# Patient Record
Sex: MALE | URBAN - METROPOLITAN AREA
[De-identification: names, ages, dates, MRNs, and addresses within clinical notes are randomized per-mention and may not be internally consistent; named-entity substitution may affect disease eponyms.]

---

## 2024-01-27 ENCOUNTER — EMERGENCY (EMERGENCY)
Facility: HOSPITAL | Age: 44
LOS: 0 days | Discharge: HOME | End: 2024-01-28
Attending: STUDENT IN AN ORGANIZED HEALTH CARE EDUCATION/TRAINING PROGRAM
Payer: COMMERCIAL

## 2024-01-27 VITALS
TEMPERATURE: 97 F | WEIGHT: 177.91 LBS | SYSTOLIC BLOOD PRESSURE: 140 MMHG | HEIGHT: 67 IN | RESPIRATION RATE: 18 BRPM | DIASTOLIC BLOOD PRESSURE: 78 MMHG | OXYGEN SATURATION: 99 % | HEART RATE: 85 BPM

## 2024-01-27 DIAGNOSIS — Z88.8 ALLERGY STATUS TO OTHER DRUGS, MEDICAMENTS AND BIOLOGICAL SUBSTANCES STATUS: ICD-10-CM

## 2024-01-27 DIAGNOSIS — M54.2 CERVICALGIA: ICD-10-CM

## 2024-01-27 DIAGNOSIS — R11.10 VOMITING, UNSPECIFIED: ICD-10-CM

## 2024-01-27 DIAGNOSIS — S09.90XA UNSPECIFIED INJURY OF HEAD, INITIAL ENCOUNTER: ICD-10-CM
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
## 2024-01-27 PROCEDURE — 70450 CT HEAD/BRAIN W/O DYE: CPT | Mod: MA

## 2024-01-27 PROCEDURE — 99284 EMERGENCY DEPT VISIT MOD MDM: CPT

## 2024-01-27 PROCEDURE — 72125 CT NECK SPINE W/O DYE: CPT | Mod: MA

## 2024-01-27 PROCEDURE — 99284 EMERGENCY DEPT VISIT MOD MDM: CPT | Mod: 25

## 2024-01-27 NOTE — ED PROVIDER NOTE - PATIENT PORTAL LINK FT
You can access the FollowMyHealth Patient Portal offered by [facility] by registering at the following website: [email or web address]. By joining [facility]’s FollowMyHealth portal, you will also be able to view your health information using other applications (apps) compatible with our system.

## 2024-01-27 NOTE — ED PROVIDER NOTE - OBJECTIVE STATEMENT
43-year-old male presents with complaint of fall.  Patient reports he was partying earlier today when he had a mechanical fall backwards, hitting his head against his electric fireplace.  Reports subsequent pain to the back of neck and occiput. Reports after falling he got anxious and had 2 epiosdes NBNB emesis. Denies LOC, use of ACs, vision changes, gait disturbance, focal weakness/numbness, other injuries.

## 2024-01-27 NOTE — ED PROVIDER NOTE - NSFOLLOWUPINSTRUCTIONS_ED_ALL_ED_FT
Follow-up with your PCP in 1-3 days.    Closed Head Injury    A closed head injury is an injury to your head that may or may not involve a traumatic brain injury (TBI). Symptoms of TBI can be short or long lasting and include headache, dizziness, interference with memory or speech, fatigue, confusion, changes in sleep, mood changes, nausea, depression/anxiety, and dulling of senses. Make sure to obtain proper rest which includes getting plenty of sleep, avoiding excessive visual stimulation, and avoiding activities that may cause physical or mental stress. Avoid any situation where there is potential for another head injury, including sports.    SEEK IMMEDIATE MEDICAL CARE IF YOU HAVE ANY OF THE FOLLOWING SYMPTOMS: unusual drowsiness, vomiting, severe dizziness, seizures, lightheadedness, muscular weakness, different pupil sizes, visual changes, or clear or bloody discharge from your ears or nose.

## 2024-01-27 NOTE — ED PROVIDER NOTE - CLINICAL SUMMARY MEDICAL DECISION MAKING FREE TEXT BOX
43-year-old male, no past medical history, presenting status post fall.  He states that he was drinking earlier in the day and lost his balance and fell backwards hitting the back of his head and neck onto his electric fireplace which remained intact.  Afterwards, he states he had a "panic attack" and had 2 episodes of nonbloody nonbilious vomiting.  He endorses mild posterior upper neck pain, twinges with movement, alleviated by rest, no other symptoms.  Denies LOC, AC use, dizziness, chest pain, shortness of breath, nausea.  Fall witnessed by wife who is at bedside providing additional history.  Well appearing on exam.  FROM of neck.  Minimal upper midline c-spine tenderness.  No external signs of trauma.  No focal neuro deficits.  Imaging ordered and reviewed.  Patient not requiring medications at this time.  Discussed results with patient and wife.  Given return precautions and follow-up outpatient.  Patient comfortable with plan.

## 2024-01-27 NOTE — ED ADULT TRIAGE NOTE - CHIEF COMPLAINT QUOTE
pt sts he was partying and drinking , tripped and fell hitting the back of his head. no loc, no blood thinners.

## 2024-01-27 NOTE — ED PROVIDER NOTE - PHYSICAL EXAMINATION
Physical Exam    Vital Signs: I have reviewed the initial vital signs.  Constitutional: appears stated age, no acute distress  Head: NCAT  Eyes: Conjunctiva pink, Sclera clear, PERRLA, EOMI.  Cardiovascular: S1 and S2, regular rate, regular rhythm, well-perfused extremities, radial pulses equal and 2+, pedal pulses 2+ and equal  Respiratory: unlabored respiratory effort, clear to auscultation bilaterally no wheezing, rales, or rhonchi  Gastrointestinal:  abdomen soft, non-tender, no pulsatile mass, bowel sounds within normal limits  Musculoskeletal: supple neck, no lower extremity edema, no midline tenderness, no bony tenderness  Integumentary: warm, dry, no rash  Neurologic: awake, alert, oriented x3, extremities’ motor and sensory functions grossly intact, cranial nerves II-XII grossly intact

## 2024-01-27 NOTE — ED PROVIDER NOTE - TOBACCO USE
Discharge instructions provided to pt.  ERP went over discharge instructions with pt.  Pt instructed to follow up in one week for suture removal and to watch for signs of infection.  Pt verbalized understanding.  Instructed to return to Emergency Department for new or worsening symptoms.   Unknown if ever smoked

## 2024-01-28 VITALS
OXYGEN SATURATION: 98 % | RESPIRATION RATE: 18 BRPM | TEMPERATURE: 97 F | HEART RATE: 89 BPM | SYSTOLIC BLOOD PRESSURE: 137 MMHG | DIASTOLIC BLOOD PRESSURE: 91 MMHG

## 2024-01-28 PROCEDURE — 72125 CT NECK SPINE W/O DYE: CPT | Mod: 26,MA

## 2024-01-28 PROCEDURE — 70450 CT HEAD/BRAIN W/O DYE: CPT | Mod: 26,MA

## 2024-01-28 NOTE — ED ADULT NURSE NOTE - NSFALLRISKINTERV_ED_ALL_ED
